# Patient Record
Sex: MALE | Race: WHITE | NOT HISPANIC OR LATINO | Employment: FULL TIME | URBAN - METROPOLITAN AREA
[De-identification: names, ages, dates, MRNs, and addresses within clinical notes are randomized per-mention and may not be internally consistent; named-entity substitution may affect disease eponyms.]

---

## 2019-05-04 ENCOUNTER — HOSPITAL ENCOUNTER (EMERGENCY)
Facility: HOSPITAL | Age: 64
Discharge: HOME | End: 2019-05-04
Attending: EMERGENCY MEDICINE

## 2019-05-04 VITALS
HEART RATE: 78 BPM | OXYGEN SATURATION: 97 % | BODY MASS INDEX: 30.31 KG/M2 | RESPIRATION RATE: 15 BRPM | DIASTOLIC BLOOD PRESSURE: 88 MMHG | TEMPERATURE: 99 F | SYSTOLIC BLOOD PRESSURE: 142 MMHG | HEIGHT: 68 IN | WEIGHT: 200 LBS

## 2019-05-04 DIAGNOSIS — S61.411A COMPLICATED LACERATION OF HAND, RIGHT, INITIAL ENCOUNTER: Primary | ICD-10-CM

## 2019-05-04 PROCEDURE — 12042 PR LAYR CLOS WND REST BODY 2.6-7.5 CM: ICD-10-PCS | Mod: RT,,, | Performed by: EMERGENCY MEDICINE

## 2019-05-04 PROCEDURE — 12042 INTMD RPR N-HF/GENIT2.6-7.5: CPT | Mod: RT,,, | Performed by: EMERGENCY MEDICINE

## 2019-05-04 PROCEDURE — 63600175 PHARM REV CODE 636 W HCPCS: Performed by: PHYSICIAN ASSISTANT

## 2019-05-04 PROCEDURE — 99284 PR EMERGENCY DEPT VISIT,LEVEL IV: ICD-10-PCS | Mod: 25,,, | Performed by: EMERGENCY MEDICINE

## 2019-05-04 PROCEDURE — 25000003 PHARM REV CODE 250: Performed by: EMERGENCY MEDICINE

## 2019-05-04 PROCEDURE — 99284 EMERGENCY DEPT VISIT MOD MDM: CPT | Mod: 25

## 2019-05-04 PROCEDURE — 90471 IMMUNIZATION ADMIN: CPT | Performed by: PHYSICIAN ASSISTANT

## 2019-05-04 PROCEDURE — 99284 EMERGENCY DEPT VISIT MOD MDM: CPT | Mod: 25,,, | Performed by: EMERGENCY MEDICINE

## 2019-05-04 PROCEDURE — 12042 INTMD RPR N-HF/GENIT2.6-7.5: CPT | Mod: RT

## 2019-05-04 PROCEDURE — 90715 TDAP VACCINE 7 YRS/> IM: CPT | Performed by: PHYSICIAN ASSISTANT

## 2019-05-04 PROCEDURE — S0020 INJECTION, BUPIVICAINE HYDRO: HCPCS | Performed by: EMERGENCY MEDICINE

## 2019-05-04 PROCEDURE — 25000003 PHARM REV CODE 250: Performed by: PHYSICIAN ASSISTANT

## 2019-05-04 RX ORDER — CLINDAMYCIN HYDROCHLORIDE 150 MG/1
450 CAPSULE ORAL ONCE
Status: COMPLETED | OUTPATIENT
Start: 2019-05-04 | End: 2019-05-04

## 2019-05-04 RX ORDER — LIDOCAINE HYDROCHLORIDE AND EPINEPHRINE 10; 10 MG/ML; UG/ML
10 INJECTION, SOLUTION INFILTRATION; PERINEURAL
Status: COMPLETED | OUTPATIENT
Start: 2019-05-04 | End: 2019-05-04

## 2019-05-04 RX ORDER — LISINOPRIL 20 MG/1
20 TABLET ORAL
COMMUNITY

## 2019-05-04 RX ORDER — CLINDAMYCIN HYDROCHLORIDE 300 MG/1
300 CAPSULE ORAL EVERY 8 HOURS
Qty: 30 CAPSULE | Refills: 30 | Status: SHIPPED | OUTPATIENT
Start: 2019-05-04

## 2019-05-04 RX ORDER — BUPIVACAINE HYDROCHLORIDE 5 MG/ML
10 INJECTION, SOLUTION EPIDURAL; INTRACAUDAL
Status: COMPLETED | OUTPATIENT
Start: 2019-05-04 | End: 2019-05-04

## 2019-05-04 RX ORDER — AMLODIPINE BESYLATE 10 MG/1
10 TABLET ORAL
COMMUNITY

## 2019-05-04 RX ADMIN — BUPIVACAINE HYDROCHLORIDE 50 MG: 5 INJECTION, SOLUTION EPIDURAL; INTRACAUDAL; PERINEURAL at 08:05

## 2019-05-04 RX ADMIN — CLINDAMYCIN HYDROCHLORIDE 450 MG: 150 CAPSULE ORAL at 10:05

## 2019-05-04 RX ADMIN — CLOSTRIDIUM TETANI TOXOID ANTIGEN (FORMALDEHYDE INACTIVATED), CORYNEBACTERIUM DIPHTHERIAE TOXOID ANTIGEN (FORMALDEHYDE INACTIVATED), BORDETELLA PERTUSSIS TOXOID ANTIGEN (GLUTARALDEHYDE INACTIVATED), BORDETELLA PERTUSSIS FILAMENTOUS HEMAGGLUTININ ANTIGEN (FORMALDEHYDE INACTIVATED), BORDETELLA PERTUSSIS PERTACTIN ANTIGEN, AND BORDETELLA PERTUSSIS FIMBRIAE 2/3 ANTIGEN 0.5 ML: 5; 2; 2.5; 5; 3; 5 INJECTION, SUSPENSION INTRAMUSCULAR at 06:05

## 2019-05-04 RX ADMIN — LIDOCAINE HYDROCHLORIDE AND EPINEPHRINE 10 ML: 10; 10 INJECTION, SOLUTION INFILTRATION; PERINEURAL at 07:05

## 2019-05-04 NOTE — ED TRIAGE NOTES
Pt arrived to ED via EMS with CC of unwitnessed fall while walking home today. Pt doesn't recall the event.pt denies LOC. Pt states had 5 beers today. Pt denies CP and SOB.     Patient identifiers verified and correct for Johny Avendano.    LOC: The patient is awake and oriented x 2 . Pt is speaking appropriately, no slurred speech.  APPEARANCE: Patient resting comfortably and in no acute distress. Pt is changed into a gown. No JVD visible. Pt reports pain level of 8/10.  SKIN: pt has deep laceration to to right hand with skin tear to back right forearm. Skin is warm dry, and color is consistent with ethnicity. No tenting observed and capillary refill <3 seconds. No clubbing noted to nail beds. Mucus membranes moist and acyanotic.  MUSCULOSKELETAL: Full range of motion present in all extremities. Hand  equal and leg strength strong +5 bilaterally.  RESPIRATORY: Airway is open and patent. Respirations-unlabored, regular rate, equal bilaterally on inspiration and expiration. No accessory muscle use noted. Lungs clear to auscultation in all fields bilaterally anterior and posterior.   CARDIAC: Patient has regular heart rate and rhythm.  No peripheral edema noted, and patient has no c/o chest pain.  ABDOMEN: Soft and non-tender to palpation with no distention noted. Normoactive bowel sounds X4 quadrants. Pt has no complaints of abnormal bowel movements. Pt reports normal appetite.   NEUROLOGIC: Eyes open spontaneously and facial expression symmetrical. Pt behavior appropriate to situation, and pt follows commands.  Pt reports sensation present in all extremities when touched with a finger. PERRLA  : No complaints of frequency, burning, urgency or blood in the urine.

## 2019-05-05 NOTE — ED PROVIDER NOTES
Encounter Date: 2019       History     Chief Complaint   Patient presents with    Laceration     Tripped and fell while carrying glass wine bottle. Right hand lac. He denies taking blood thinners.      63-year-old  right-hand-dominant male with hypertension and history of ETOH abuse presents for laceration to his right hand after a fall.  He endorses drinking anywhere between 4-9 beers depending who asks him, states that he tripped and fell over her while he at a wine glass in his hand.  He now sources pain and bleeding to his right hand.  Denies head injury, LOC, neck pain, back pain or injury to other body parts.  Denies numbness/tingling/weakness or decreased ROM of the affected hand.  Not on blood thinners.  He is unsure of last tetanus vaccination.        Review of patient's allergies indicates:  No Known Allergies  Past Medical History:   Diagnosis Date    Hyperlipidemia     Hypertension      Past Surgical History:   Procedure Laterality Date    BRAIN SURGERY           Family History   Problem Relation Age of Onset    Cancer Mother     Stroke Father     Melanoma Neg Hx      Social History     Tobacco Use    Smoking status: Former Smoker     Last attempt to quit: 1999     Years since quittin.8    Smokeless tobacco: Never Used   Substance Use Topics    Alcohol use: Yes     Alcohol/week: 36.0 oz     Types: 60 Cans of beer per week    Drug use: No     Review of Systems   Constitutional: Negative for fatigue and fever.   Respiratory: Negative for cough and shortness of breath.    Cardiovascular: Negative for chest pain and palpitations.   Gastrointestinal: Negative for abdominal pain, nausea and vomiting.   Genitourinary: Negative for dysuria and hematuria.   Musculoskeletal: Negative for back pain, neck pain and neck stiffness.   Skin: Positive for wound. Negative for color change.   Neurological: Negative for weakness, light-headedness, numbness and headaches.   Hematological:  Negative for adenopathy. Does not bruise/bleed easily.   Psychiatric/Behavioral: Negative for confusion and self-injury.       Physical Exam     Initial Vitals [05/04/19 1708]   BP Pulse Resp Temp SpO2   (!) 162/95 110 16 98.3 °F (36.8 °C) 98 %      MAP       --         Physical Exam    Vitals reviewed.  Constitutional: He appears well-developed and well-nourished. He is not diaphoretic. No distress.   HENT:   Head: Normocephalic and atraumatic.   Eyes: Pupils are equal, round, and reactive to light.   Neck: Normal range of motion. Neck supple.   Cardiovascular: Regular rhythm, normal heart sounds and intact distal pulses. Exam reveals no gallop and no friction rub.    No murmur heard.  Tachycardic   Pulmonary/Chest: Breath sounds normal.   Musculoskeletal: Normal range of motion.        Right hand: He exhibits normal capillary refill. Decreased sensation is not present in the ulnar distribution, is not present in the medial distribution and is not present in the radial distribution. Comments: There is a tortuous 2.5 cm laceration to the ulnar lateral surface of the right hand        Left hand: He exhibits normal capillary refill. Decreased sensation is not present in the ulnar distribution, is not present in the medial redistribution and is not present in the radial distribution.        Hands:  Neurological: He is alert and oriented to person, place, and time. He has normal strength. No sensory deficit.   Patient is alert and oriented but appears intoxicated.   Skin: Skin is warm and dry.   Psychiatric: He has a normal mood and affect.         ED Course   Lac Repair  Date/Time: 5/4/2019 10:31 PM  Performed by: Perico Kunz MD  Authorized by: Alfred Palacios DO   Body area: upper extremity  Location details: right hand  Laceration length: 5 cm  Tendon involvement: none  Nerve involvement: none  Vascular damage: no  Anesthesia: local infiltration    Anesthesia:  Local Anesthetic: bupivacaine 0.5% without  epinephrine  Anesthetic total: 8 mL  Patient sedated: no  Preparation: Patient was prepped and draped in the usual sterile fashion.  Irrigation solution: saline  Irrigation method: jet lavage  Amount of cleaning: extensive  Debridement: none  Degree of undermining: minimal  Skin closure: 3-0 Prolene (21)  Subcutaneous closure: 4-0 Vicryl (5)  Wound fascia closure material used: 4.  Number of sutures: 26  Technique: simple  Approximation: close  Approximation difficulty: complex  Dressing: 4x4 sterile gauze and bulky dressing  Patient tolerance: Patient tolerated the procedure well with no immediate complications        Labs Reviewed - No data to display       Imaging Results          X-Ray Hand 3 View Right (Final result)  Result time 05/04/19 19:25:07    Final result by Ramesh Bates MD (05/04/19 19:25:07)                 Impression:      1. Soft tissue injury involving the tissues about the 5th digit as described above, no convincing underlying osseous abnormality.  2. Cortical irregularity involving the thenar aspect of the radius, noting well corticated nature suggests sequela of either previous injury or degenerative change.      Electronically signed by: Ramesh Bates MD  Date:    05/04/2019  Time:    19:25             Narrative:    EXAMINATION:  XR HAND COMPLETE 3 VIEW RIGHT    CLINICAL HISTORY:  laceration;    TECHNIQUE:  PA, lateral, and oblique views of the right hand were performed.    COMPARISON:  None    FINDINGS:  Four views.    No acute displaced fracture or dislocation of the hand.  No convincing radiopaque foreign body.  There is soft tissue injury overlying the proximal right 5th digit and in the region of the right distal metacarpal.  Bandaging material overlies the region.  No discrete underlying osseous abnormality.  There is cortical irregularity about the thenar aspect of the distal radius, suggesting sequela of previous injury or degenerative change.                                        APC / Resident Notes:   63-year-old male presenting for laceration to the lateral side of his right hand after a fall.  On ED arrival, he appears intoxicated but is alert and oriented. He is neurovascularly intact. Differential diagnosis includes retained foreign body fevers laceration versus open fracture.  Will do x-ray and reassess.    I discussed this patient with my supervising physician and I am signing out to Perico Kunz MD.    7:25 PM  Estela Wayne PA-C              UPDATED    After bedside laceration repair and thorough cleansing with 2.5L with Hibiclens additive the pt was administered one dose of clindamycin in addition to being provided with a 10 day Rx. Vitals are stable and he is A&Ox3 w/o deficits.   Perico Kunz MD       Clinical Impression:   COMPLICATED HAND LACERATION                          Estela Wayne PA-C  05/04/19 1926       Perico Kunz MD  Resident  05/04/19 4795

## 2019-05-05 NOTE — ED PROVIDER NOTES
Encounter Date: 2019       History     Chief Complaint   Patient presents with    Laceration     Tripped and fell while carrying glass wine bottle. Right hand lac. He denies taking blood thinners.      HPI  Review of patient's allergies indicates:  No Known Allergies  Past Medical History:   Diagnosis Date    Hyperlipidemia     Hypertension      Past Surgical History:   Procedure Laterality Date    BRAIN SURGERY           Family History   Problem Relation Age of Onset    Cancer Mother     Stroke Father     Melanoma Neg Hx      Social History     Tobacco Use    Smoking status: Former Smoker     Last attempt to quit: 1999     Years since quittin.4    Smokeless tobacco: Never Used   Substance Use Topics    Alcohol use: Yes     Alcohol/week: 60.0 standard drinks     Types: 60 Cans of beer per week    Drug use: No     Review of Systems    Physical Exam     Initial Vitals [19 1708]   BP Pulse Resp Temp SpO2   (!) 162/95 110 16 98.3 °F (36.8 °C) 98 %      MAP       --         Physical Exam    ED Course   Procedures  Labs Reviewed - No data to display       Imaging Results          X-Ray Hand 3 View Right (Final result)  Result time 19 19:25:07    Final result by Ramesh Bates MD (19 19:25:07)                 Impression:      1. Soft tissue injury involving the tissues about the 5th digit as described above, no convincing underlying osseous abnormality.  2. Cortical irregularity involving the thenar aspect of the radius, noting well corticated nature suggests sequela of either previous injury or degenerative change.      Electronically signed by: Ramesh Bates MD  Date:    2019  Time:    19:25             Narrative:    EXAMINATION:  XR HAND COMPLETE 3 VIEW RIGHT    CLINICAL HISTORY:  laceration;    TECHNIQUE:  PA, lateral, and oblique views of the right hand were performed.    COMPARISON:  None    FINDINGS:  Four views.    No acute displaced fracture or dislocation  of the hand.  No convincing radiopaque foreign body.  There is soft tissue injury overlying the proximal right 5th digit and in the region of the right distal metacarpal.  Bandaging material overlies the region.  No discrete underlying osseous abnormality.  There is cortical irregularity about the thenar aspect of the distal radius, suggesting sequela of previous injury or degenerative change.                                              Attending Attestation:   Physician Attestation Statement for Resident:  As the supervising MD   Physician Attestation Statement: I have personally seen and examined this patient.   I agree with the above history. -:   As the supervising MD I agree with the above PE.    As the supervising MD I agree with the above treatment, course, plan, and disposition.    Physician Attestation Statement for NP/PA:   I discussed this assessment and plan of this patient with the NP/PA, but I did not personally examine the patient. The face to face encounter was performed by the NP/PA.                     Clinical Impression:       ICD-10-CM ICD-9-CM   1. Complicated laceration of hand, right, initial encounter S61.411A 882.1                                Alfred Palacios, DO  11/09/19 1118

## 2019-05-05 NOTE — ED NOTES
Pt's right hand laceration wrapped in sterile dressing pt informed on proper care for dressing. Pt stable for d/c no acute bleeding noted.

## 2019-05-21 ENCOUNTER — HOSPITAL ENCOUNTER (EMERGENCY)
Facility: HOSPITAL | Age: 64
Discharge: HOME | End: 2019-05-21
Attending: EMERGENCY MEDICINE

## 2019-05-21 VITALS
RESPIRATION RATE: 16 BRPM | DIASTOLIC BLOOD PRESSURE: 114 MMHG | WEIGHT: 200 LBS | TEMPERATURE: 99 F | OXYGEN SATURATION: 96 % | BODY MASS INDEX: 30.31 KG/M2 | HEIGHT: 68 IN | SYSTOLIC BLOOD PRESSURE: 193 MMHG | HEART RATE: 111 BPM

## 2019-05-21 DIAGNOSIS — Z48.02 ENCOUNTER FOR REMOVAL OF SUTURES: ICD-10-CM

## 2019-05-21 DIAGNOSIS — T14.8XXA WOUND INFECTION: Primary | ICD-10-CM

## 2019-05-21 DIAGNOSIS — L08.9 WOUND INFECTION: Primary | ICD-10-CM

## 2019-05-21 PROCEDURE — 99283 EMERGENCY DEPT VISIT LOW MDM: CPT

## 2019-05-21 PROCEDURE — 99284 EMERGENCY DEPT VISIT MOD MDM: CPT | Mod: ,,, | Performed by: PHYSICIAN ASSISTANT

## 2019-05-21 PROCEDURE — 99284 PR EMERGENCY DEPT VISIT,LEVEL IV: ICD-10-PCS | Mod: ,,, | Performed by: PHYSICIAN ASSISTANT

## 2019-05-21 PROCEDURE — 25000003 PHARM REV CODE 250: Performed by: EMERGENCY MEDICINE

## 2019-05-21 PROCEDURE — 25000003 PHARM REV CODE 250: Performed by: PHYSICIAN ASSISTANT

## 2019-05-21 RX ORDER — SULFAMETHOXAZOLE AND TRIMETHOPRIM 800; 160 MG/1; MG/1
1 TABLET ORAL 2 TIMES DAILY
Qty: 14 TABLET | Refills: 0 | Status: SHIPPED | OUTPATIENT
Start: 2019-05-21 | End: 2019-05-28

## 2019-05-21 RX ORDER — SULFAMETHOXAZOLE AND TRIMETHOPRIM 800; 160 MG/1; MG/1
1 TABLET ORAL
Status: COMPLETED | OUTPATIENT
Start: 2019-05-21 | End: 2019-05-21

## 2019-05-21 RX ADMIN — BACITRACIN ZINC, NEOMYCIN SULFATE, POLYMYXIN B SULFATE 1 EACH: 3.5; 5000; 4 OINTMENT TOPICAL at 03:05

## 2019-05-21 RX ADMIN — SULFAMETHOXAZOLE AND TRIMETHOPRIM 1 TABLET: 800; 160 TABLET ORAL at 03:05

## 2019-05-21 NOTE — DISCHARGE INSTRUCTIONS
You have 21 sutures removed today. There is concern for infection since you had some drainage. Please take Bactrim twice daily for 7 days. TAKING YOUR ANTIBIOTICS IS VERY IMPORTANT. Please follow up with your Primary Care Physician within a week to make sure that your symptoms are improving. Return to the ER if you develop redness, swelling, or if any other concerning symptoms.

## 2019-05-21 NOTE — ED PROVIDER NOTES
Encounter Date: 2019       History     Chief Complaint   Patient presents with    Suture / Staple Removal     r hand     63-year-old with PMHx of HTN, HLP, ETOH abuse, and right hand dominance who presents to the ED for suture removal. Pt had right hand laceration repair on 2019 with x-ray showing no fracture or foreign body. Patient given single dose of clindamycin in ED and was discharges with 10 day course of the same. However, patient never filled prescription.  Patient reports that he has not cleaned the laceration since being evaluated in the ED as he was concerned for contamination. He he has mild pain localized to the wound, but denies any discharge or bleeding from wound.  Patient reports drinking half a bottle of wine last night.  He contributes his tremors to feeling cold.  He denies fevers, chills, chest pain, shortness of breath, abdominal pain, nausea, vomiting, weakness, numbness, swelling, redness, or any other medical complaints.     The history is provided by the patient.     Review of patient's allergies indicates:  No Known Allergies  Past Medical History:   Diagnosis Date    Hyperlipidemia     Hypertension      Past Surgical History:   Procedure Laterality Date    BRAIN SURGERY           Family History   Problem Relation Age of Onset    Cancer Mother     Stroke Father     Melanoma Neg Hx      Social History     Tobacco Use    Smoking status: Former Smoker     Last attempt to quit: 1999     Years since quittin.9    Smokeless tobacco: Never Used   Substance Use Topics    Alcohol use: Yes     Alcohol/week: 36.0 oz     Types: 60 Cans of beer per week    Drug use: No     Review of Systems   Constitutional: Negative for chills and fever.   HENT: Negative for congestion.    Eyes: Negative for visual disturbance.   Respiratory: Negative for shortness of breath.    Cardiovascular: Negative for chest pain.   Gastrointestinal: Negative for abdominal pain, nausea and  vomiting.   Genitourinary: Negative for dysuria.   Musculoskeletal: Negative for joint swelling.        +Right hand pain   Skin: Positive for wound. Negative for color change.   Neurological: Positive for tremors. Negative for dizziness, weakness, numbness and headaches.   Hematological: Does not bruise/bleed easily.       Physical Exam     Initial Vitals [05/21/19 1225]   BP Pulse Resp Temp SpO2   (!) 193/114 (!) 111 16 98.9 °F (37.2 °C) 96 %      MAP       --         Physical Exam    Nursing note and vitals reviewed.  Constitutional: He appears well-developed and well-nourished.   HENT:   Head: Normocephalic and atraumatic.   Eyes: Conjunctivae and EOM are normal.   Neck: Normal range of motion. Neck supple.   Cardiovascular: Regular rhythm and normal heart sounds. Tachycardia present.    Pulmonary/Chest: Breath sounds normal. He has no wheezes. He has no rhonchi. He has no rales.   Abdominal: Soft. There is no tenderness. There is no rebound and no guarding.   Musculoskeletal: Normal range of motion. He exhibits tenderness. He exhibits no edema.   Tenderness to laceration on right hand.    Neurological: He is alert and oriented to person, place, and time.   Patient tremulous throughout upper extremities. Sensation intact at upper extremity.  strength intact in RUE.    Skin: Skin is warm. Capillary refill takes less than 2 seconds.   Two lacerations to the ulnar lateral surface of right hand, one 5 cm tortuous laceration and 2 cm linear laceration. Granulation tissue around sutures. Associated tenderness to palpation. No surrounding erythema, induration, orf fluctuance.    Psychiatric: He has a normal mood and affect.                  ED Course   Suture Removal  Date/Time: 5/21/2019 3:11 PM  Location procedure was performed: [facility] EMERGENCY DEPARTMENT  Performed by: Joanie Epps PA-C  Authorized by: Eros Gamez MD   Assisting provider: Joanie Epps PA-C  Pre-operative diagnosis:  laceration  Post-operative diagnosis: lacertion  Body area: upper extremity  Location details: left hand  Description of findings: 5 cm laceration with 21 sutures, scant amount of purulent drainge expressed   Wound Appearance: tender, draining and purulent  Sutures Removed: 21  Post-removal: dressing applied and antibiotic ointment applied  Technical procedures used: suture removal  Significant surgical tasks conducted by the assistant(s): suture removal  Complications: No  Estimated blood loss (mL): 0  Specimens: No  Implants: No  Patient tolerance: Patient tolerated the procedure well with no immediate complications        Labs Reviewed - No data to display       Imaging Results    None          Medical Decision Making:   History:   Old Medical Records: I decided to obtain old medical records.  Old Records Summarized: records from clinic visits.       <> Summary of Records: Reviewed ED note March 4, 2019.   Initial Assessment:   63-year-old with PMHx of HTN, HLP, ETOH abuse, and right hand dominance who presents to the ED for suture removal. Pt had right hand laceration repair on March 4, 2019 with x-ray showing no fracture or foreign body. Patient given single dose of clindamycin in ED and was discharges with 10 day course of the same, which the patient never filled. Pt did not clean wound due to fear of contamination. Pt is tachycardic and tremulous. Afebrile. See picture for details of wound.   Differential Diagnosis:   DDx includes but is not limited to encounter for suture removal, laceration, infection, cellulitis, abscess formation, ETOH abuse/withdrawal.   ED Management:  Pt is tremulous throughout, which the pt contributes to being cold.  I do not believe further labs or imaging are indicated at this time. 21 sutures removed from lacerations to right hand. Blood tinged purulent drainage expressed once sutures removed from palmar aspect of larger laceration.  See procedure note for details.  Pt tolerated  procedure well.     Pt is nontoxic appearing and is stable for discharge with instructions to follow up with PCP within a week. Pt given Bactrim prescription to be taken instead of clindamycin. Stressed the importance of antibiotics in setting of possible wound infection. Reviewed proper wound care. Strict ER return precautions given. All questions answered. The patient expressed understanding agreeable with plan.    5/22/2019 10:51 AM: Called patient, who reports that he has not filled prescription but plans to do so this afternoon. Reiterated the importance of proper wound care and antibiotic treatment. Pt expressed understanding.     I have discussed the treatment and management of this patient with my supervising physician, and we agree on the plan of care.      Joanie Epps PA-C                Attending Attestation:     Physician Attestation Statement for NP/PA:   I have conducted a face to face encounter with this patient in addition to the NP/PA, due to Medical Complexity                     Clinical Impression:       ICD-10-CM ICD-9-CM   1. Wound infection T14.8XXA 958.3    L08.9    2. Encounter for removal of sutures Z48.02 V58.32         Disposition:   Disposition: Discharged  Condition: Stable                        Joanie Epsp PA-C  05/22/19 1100       Eros Gamez MD  05/29/19 1800

## 2019-05-21 NOTE — ED NOTES
LOC: The patient is awake, alert and aware of environment with an appropriate affect, the patient is oriented x 3 and speaking appropriately.  APPEARANCE: Patient resting comfortably and in no acute distress, patient is clean and well groomed, patient's clothing is properly fastened.  SKIN: The skin is warm and dry, patient has normal skin turgor and moist mucus membranes. Patient with sutures present to right hand s/p fall x 17 days ago. Sutures are well approximated and no discharge/drainage noted.   MUSCULOSKELETAL: Patient moving all extremities well, no obvious swelling or deformities noted.   RESPIRATORY: Airway is open and patent, respirations are spontaneous, patient has a normal effort and rate. Breath sounds are clear and equal bilaterally.  CARDIAC: Normal heart sounds. No peripheral edema. Denies chest pain or SOB.  ABDOMEN: Soft and non tender to palpation, no distention noted. Bowel sounds present. Denies NVD.

## 2019-05-21 NOTE — ED NOTES
All discharge instructions provided to patient and questions addressed. NAD noted and patient A&Ox4. All belongings with patient at time of departure. Patient ambulating out of department with steady gait.

## 2019-05-21 NOTE — ED TRIAGE NOTES
Patient presents for suture removal from right hand. Patient reports he fell 17 days ago and cut hand. Patient denies s/s of infection.

## 2025-03-29 ENCOUNTER — HOSPITAL ENCOUNTER (EMERGENCY)
Facility: HOSPITAL | Age: 70
Discharge: HOME | End: 2025-03-30
Attending: EMERGENCY MEDICINE
Payer: MEDICARE

## 2025-03-29 DIAGNOSIS — F10.920 ALCOHOLIC INTOXICATION WITHOUT COMPLICATION: Primary | ICD-10-CM

## 2025-03-29 PROCEDURE — 96361 HYDRATE IV INFUSION ADD-ON: CPT

## 2025-03-29 PROCEDURE — 99284 EMERGENCY DEPT VISIT MOD MDM: CPT | Mod: 25

## 2025-03-29 PROCEDURE — 80053 COMPREHEN METABOLIC PANEL: CPT | Performed by: EMERGENCY MEDICINE

## 2025-03-29 PROCEDURE — 25000003 PHARM REV CODE 250: Performed by: EMERGENCY MEDICINE

## 2025-03-29 PROCEDURE — 85025 COMPLETE CBC W/AUTO DIFF WBC: CPT | Performed by: EMERGENCY MEDICINE

## 2025-03-29 RX ADMIN — SODIUM CHLORIDE 500 ML: 9 INJECTION, SOLUTION INTRAVENOUS at 11:03

## 2025-03-30 VITALS
BODY MASS INDEX: 30.31 KG/M2 | TEMPERATURE: 98 F | HEART RATE: 101 BPM | SYSTOLIC BLOOD PRESSURE: 162 MMHG | RESPIRATION RATE: 20 BRPM | WEIGHT: 200 LBS | HEIGHT: 68 IN | OXYGEN SATURATION: 96 % | DIASTOLIC BLOOD PRESSURE: 78 MMHG

## 2025-03-30 LAB
ABSOLUTE EOSINOPHIL (OHS): 0.63 K/UL
ABSOLUTE MONOCYTE (OHS): 0.7 K/UL (ref 0.3–1)
ABSOLUTE NEUTROPHIL COUNT (OHS): 3.48 K/UL (ref 1.8–7.7)
ALBUMIN SERPL BCP-MCNC: 3.6 G/DL (ref 3.5–5.2)
ALP SERPL-CCNC: 72 UNIT/L (ref 40–150)
ALT SERPL W/O P-5'-P-CCNC: 15 UNIT/L (ref 10–44)
ANION GAP (OHS): 16 MMOL/L (ref 8–16)
AST SERPL-CCNC: 34 UNIT/L (ref 11–45)
BASOPHILS # BLD AUTO: 0.14 K/UL
BASOPHILS NFR BLD AUTO: 2.2 %
BILIRUB SERPL-MCNC: 0.2 MG/DL (ref 0.1–1)
BUN SERPL-MCNC: 15 MG/DL (ref 8–23)
CALCIUM SERPL-MCNC: 9.8 MG/DL (ref 8.7–10.5)
CHLORIDE SERPL-SCNC: 104 MMOL/L (ref 95–110)
CO2 SERPL-SCNC: 19 MMOL/L (ref 23–29)
CREAT SERPL-MCNC: 1 MG/DL (ref 0.5–1.4)
ERYTHROCYTE [DISTWIDTH] IN BLOOD BY AUTOMATED COUNT: 13.8 % (ref 11.5–14.5)
GFR SERPLBLD CREATININE-BSD FMLA CKD-EPI: >60 ML/MIN/1.73/M2
GLUCOSE SERPL-MCNC: 116 MG/DL (ref 70–110)
HCT VFR BLD AUTO: 37.8 % (ref 40–54)
HGB BLD-MCNC: 13 GM/DL (ref 14–18)
IMM GRANULOCYTES # BLD AUTO: 0.08 K/UL (ref 0–0.04)
IMM GRANULOCYTES NFR BLD AUTO: 1.2 % (ref 0–0.5)
LYMPHOCYTES # BLD AUTO: 1.47 K/UL (ref 1–4.8)
MCH RBC QN AUTO: 34.4 PG (ref 27–50)
MCHC RBC AUTO-ENTMCNC: 34.4 G/DL (ref 32–36)
MCV RBC AUTO: 100 FL (ref 82–98)
NUCLEATED RBC (/100WBC) (OHS): 0 /100 WBC
PLATELET # BLD AUTO: 339 K/UL (ref 150–450)
PLATELET BLD QL SMEAR: NORMAL
PMV BLD AUTO: 10 FL (ref 9.2–12.9)
POTASSIUM SERPL-SCNC: 5.2 MMOL/L (ref 3.5–5.1)
PROT SERPL-MCNC: 8.6 GM/DL (ref 6–8.4)
RBC # BLD AUTO: 3.78 M/UL (ref 4.6–6.2)
RELATIVE EOSINOPHIL (OHS): 9.7 %
RELATIVE LYMPHOCYTE (OHS): 22.6 % (ref 18–48)
RELATIVE MONOCYTE (OHS): 10.8 % (ref 4–15)
RELATIVE NEUTROPHIL (OHS): 53.5 % (ref 38–73)
SODIUM SERPL-SCNC: 139 MMOL/L (ref 136–145)
WBC # BLD AUTO: 6.5 K/UL (ref 3.9–12.7)

## 2025-03-30 PROCEDURE — 25000003 PHARM REV CODE 250: Performed by: STUDENT IN AN ORGANIZED HEALTH CARE EDUCATION/TRAINING PROGRAM

## 2025-03-30 PROCEDURE — 63600175 PHARM REV CODE 636 W HCPCS: Performed by: STUDENT IN AN ORGANIZED HEALTH CARE EDUCATION/TRAINING PROGRAM

## 2025-03-30 PROCEDURE — 96374 THER/PROPH/DIAG INJ IV PUSH: CPT

## 2025-03-30 PROCEDURE — 96361 HYDRATE IV INFUSION ADD-ON: CPT

## 2025-03-30 PROCEDURE — 25000003 PHARM REV CODE 250: Performed by: EMERGENCY MEDICINE

## 2025-03-30 RX ORDER — SODIUM CHLORIDE 9 MG/ML
1000 INJECTION, SOLUTION INTRAVENOUS
Status: COMPLETED | OUTPATIENT
Start: 2025-03-30 | End: 2025-03-30

## 2025-03-30 RX ORDER — THIAMINE HYDROCHLORIDE 100 MG/ML
100 INJECTION, SOLUTION INTRAMUSCULAR; INTRAVENOUS
Status: COMPLETED | OUTPATIENT
Start: 2025-03-30 | End: 2025-03-30

## 2025-03-30 RX ADMIN — THIAMINE HYDROCHLORIDE 100 MG: 100 INJECTION, SOLUTION INTRAMUSCULAR; INTRAVENOUS at 01:03

## 2025-03-30 RX ADMIN — SODIUM ZIRCONIUM CYCLOSILICATE 10 G: 10 POWDER, FOR SUSPENSION ORAL at 12:03

## 2025-03-30 RX ADMIN — SODIUM CHLORIDE 1000 ML: 9 INJECTION, SOLUTION INTRAVENOUS at 01:03

## 2025-03-30 NOTE — ED PROVIDER NOTES
"Encounter Date: 3/29/2025       History     Chief Complaint   Patient presents with    Alcohol Intoxication     [place] [address], Clinical signs of ETOH.      Johny Avendano is a 69 y.o. male who  has a past medical history of Hyperlipidemia and Hypertension.    The patient presents to the ED due to alcohol intoxication.  He was brought in by ambulance.  Patient was at a bar for several hours and patient's were concerned about his inability to to walk and were concerned he might be having a stroke.  Patient adamantly denies any stroke-like symptoms, stating "I am not having a fucking stroke" in his moving all of his extremities.  He denies any pain when asked.  Patient's friend was supposed to drive him home but she could not find him at the bar.  Patient admits to drinking alcohol and his friend who he was speaking with on the phone on arrival states he had at least 2 doubles that she knows of and after she lost she thinks he might have been served more drinks. Patient is pleasantly intoxicated and denies any complaints.       Review of patient's allergies indicates:  No Known Allergies  Past Medical History:   Diagnosis Date    Hyperlipidemia     Hypertension      Past Surgical History:   Procedure Laterality Date    BRAIN SURGERY      1983     Family History   Problem Relation Name Age of Onset    Cancer Mother      Stroke Father      Melanoma Neg Hx       Social History[1]  Review of Systems   Constitutional:  Negative for fever.   HENT:  Negative for sore throat.    Respiratory:  Negative for shortness of breath.    Cardiovascular:  Negative for chest pain.   Gastrointestinal:  Negative for nausea.   Genitourinary:  Negative for dysuria.   Musculoskeletal:  Negative for back pain.   Skin:  Negative for rash.   Neurological:  Negative for weakness.   Hematological:  Does not bruise/bleed easily.       Physical Exam     Initial Vitals [03/29/25 2304]   BP Pulse Resp Temp SpO2   (!) 148/87 (!) " 119 16 97.6 °F (36.4 °C) 100 %      MAP       --         Physical Exam    Constitutional: He appears well-nourished. He is not diaphoretic.  Non-toxic appearance. No distress.   Positive ETOH    HENT:   Head: Normocephalic and atraumatic.   Eyes: Conjunctivae are normal. Pupils are equal, round, and reactive to light.   Neck: Neck supple.   Cardiovascular:  Normal rate, regular rhythm, S1 normal and S2 normal.     Exam reveals no gallop.       No murmur heard.  Pulmonary/Chest: Breath sounds normal. He has no wheezes. He has no rales.   Abdominal: Abdomen is soft. He exhibits no distension. There is no abdominal tenderness.   Musculoskeletal:      Cervical back: Neck supple.     Neurological: He is alert. He has normal strength. No cranial nerve deficit or sensory deficit. GCS score is 15.   CN 2-12 intact  Finger to nose within normal limits  Equal strength to bilateral upper extremities, SILT  Equal strength to bilateral lower extremities, SILT         Skin: Skin is warm and dry. No rash noted.   Psychiatric: He has a normal mood and affect. His behavior is normal.         ED Course   Procedures  Labs Reviewed   COMPREHENSIVE METABOLIC PANEL - Abnormal       Result Value    Sodium 139      Potassium 5.2 (*)     Chloride 104      CO2 19 (*)     Glucose 116 (*)     BUN 15      Creatinine 1.0      Calcium 9.8      Protein Total 8.6 (*)     Albumin 3.6      Bilirubin Total 0.2      ALP 72      AST 34      ALT 15      Anion Gap 16      eGFR >60     CBC WITH DIFFERENTIAL - Abnormal    WBC 6.50      RBC 3.78 (*)     HGB 13.0 (*)     HCT 37.8 (*)      (*)     MCH 34.4      MCHC 34.4      RDW 13.8      Platelet Count 339      MPV 10.0      Nucleated RBC 0      Neut % 53.5      Lymph % 22.6      Mono % 10.8      Eos % 9.7 (*)     Basophil % 2.2 (*)     Imm Grans % 1.2 (*)     Neut # 3.48      Lymph # 1.47      Mono # 0.70      Eos # 0.63 (*)     Baso # 0.14      Imm Grans # 0.08 (*)     Narrative:     This is an  appended report.  These results have been appended to a previously verified report.   PLATELET REVIEW - Normal    Platelet Estimate Appears Normal     CBC W/ AUTO DIFFERENTIAL    Narrative:     The following orders were created for panel order CBC auto differential.  Procedure                               Abnormality         Status                     ---------                               -----------         ------                     CBC with Differential[[id number]]        Abnormal            Final result                 Please view results for these tests on the individual orders.          Imaging Results    None          Medications   sodium chloride 0.9% bolus 500 mL 500 mL (0 mLs Intravenous Stopped 3/30/25 0051)   sodium zirconium cyclosilicate packet 10 g (10 g Oral Given 3/30/25 0025)   0.9% NaCl infusion (0 mLs Intravenous Stopped 3/30/25 0207)   thiamine injection 100 mg (100 mg Intravenous Given 3/30/25 0105)     Medical Decision Making  Patient here by ambulance for alcohol intoxication/altered mental status.  Patient admits to drinking alcohol which was corroborated by his friend.  His vital signs remarkable for tachycardia, he has no focal neuro findings.  No signs of head trauma.  Will monitor for sobriety obtain basic labs and reassess.     Amount and/or Complexity of Data Reviewed  Labs: ordered. Decision-making details documented in ED Course.    Risk  Prescription drug management.               ED Course as of 04/01/25 1450   Sat Mar 29, 2025   2358 CBC auto differential(!)  No significant abnormality.    [RN]   Sun Mar 30, 2025   0009 Comprehensive metabolic panel(!) [RN]   0051 Patient is pending sobriety for disposition, anticipate discharge when patient is clinically sober.  Discussed with overnight physician who will assume care and disposition accordingly [RN]      ED Course User Index  [RN] Terry Izquierdo Jr., MD                           Clinical Impression:  Final  diagnoses:  [F10.920] Alcoholic intoxication without complication (Primary)          ED Disposition Condition    Discharge           ED Prescriptions    None       Follow-up Information    None         Portions of this note were dictated using voice recognition software and may contain dictation related errors in spelling/grammar/syntax not found on text review           [1]   Social History  Tobacco Use    Smoking status: Former     Current packs/day: 0.00     Types: Cigarettes     Quit date: 1999     Years since quittin.7    Smokeless tobacco: Never   Substance Use Topics    Alcohol use: Yes     Alcohol/week: 60.0 standard drinks of alcohol     Types: 60 Cans of beer per week    Drug use: No        Terry Izquierdo Jr., MD  [phone]

## 2025-03-30 NOTE — ED NOTES
Pt states he was at a bar and he is unsure of how much he has had to drink tonight. His friend Swathi that was on his phone when he arrived states the patient was drinking vodka prior to arriving at the bar. Pt states that he was drinking vodka prior to going to the bar [address].

## 2025-03-30 NOTE — PROVIDER PROGRESS NOTES - EMERGENCY DEPT.
"Encounter Date: 3/29/2025    ED Physician Progress Notes        Patient signed out to me pending morning reassessment for sobriety.  I reassessed and examined the patient, he states he feels much better and wants to go home.  He is speaking clearly in complete sentences, is clinically sober, and was able to ambulate with a stable gait.  Of note he states that he actually walks with a walker most of the time at home, but he did not bring it to the bar.  A medical screening exam was completed and no other emergency medical condition is present.    Of note I counseled the patient to please drink more responsibly, his exact response to this was unfortunately No!"    "